# Patient Record
Sex: FEMALE | Race: BLACK OR AFRICAN AMERICAN | Employment: OTHER | URBAN - METROPOLITAN AREA
[De-identification: names, ages, dates, MRNs, and addresses within clinical notes are randomized per-mention and may not be internally consistent; named-entity substitution may affect disease eponyms.]

---

## 2023-04-06 ENCOUNTER — OFFICE VISIT (OUTPATIENT)
Dept: ORTHOPEDIC SURGERY | Age: 78
End: 2023-04-06

## 2023-04-06 DIAGNOSIS — M25.512 LEFT SHOULDER PAIN, UNSPECIFIED CHRONICITY: Primary | ICD-10-CM

## 2023-04-06 DIAGNOSIS — M67.912 TENDINOPATHY OF LEFT ROTATOR CUFF: ICD-10-CM

## 2023-04-06 RX ORDER — METHYLPREDNISOLONE ACETATE 40 MG/ML
40 INJECTION, SUSPENSION INTRA-ARTICULAR; INTRALESIONAL; INTRAMUSCULAR; SOFT TISSUE ONCE
Status: COMPLETED | OUTPATIENT
Start: 2023-04-06 | End: 2023-04-06

## 2023-04-06 RX ADMIN — METHYLPREDNISOLONE ACETATE 40 MG: 40 INJECTION, SUSPENSION INTRA-ARTICULAR; INTRALESIONAL; INTRAMUSCULAR; SOFT TISSUE at 10:58

## 2023-04-06 NOTE — PROGRESS NOTES
Name: Cas Salas  YOB: 1945  Gender: female  MRN: [id number]  Date of Encounter:  4/6/2023       CHIEF COMPLAINT:     Chief Complaint   Patient presents with    Shoulder Pain     Left shoulder        SUBJECTIVE/OBJECTIVE:      HPI:    Patient is a [de-identified] y.o. pleasant female who presents today for a new evaluation of her left shoulder pain. She reports chronic left shoulder pain for a number of years. She was previously seen by Dr. Amos Warren, and likely in 2020 as we have previous images at that time. He gave her an injection and that gave her considerable pain relief for a number of years. She frequently gardens and is outdoors and has shoulder pain, particularly with lifting the left arm. She has not done any other form of therapy or treatment at this time. She is interested in repeat injection. PAST HISTORY:   Past medical, surgical, family, social history and allergies reviewed by me. Pertinent history:   Tobacco use:  has no history on file for tobacco use. Diabetes: none  Anticoagulation: no      REVIEW OF SYSTEMS:   As noted in HPI. PHYSICAL EXAMINATION:     Gen: Well-developed, no acute distress   HEENT: NC/AT, EOMI   Neck: Trachea midline, normal ROM   CV: Regular rhythm by palpation of distal pulse, normal capillary refill   Pulm: No respiratory distress, no stridor   Psychiatric: Well oriented, normal mood and affect. Skin: No rashes, lesions or ulcers, normal temperature, turgor, and texture on uninvolved extremity. ORTHO EXAM:    Left Shoulder: Active abduction 110, forward flexion 120. External rotation 40. Internal rotation lumbar spine. DIAGNOSTIC IMAGING:     X-ray 3 vw LEFT shoulder Grashey  / axillary / scapular Y    Findings: There is mild glenohumeral joint arthritis. Moderate AC joint arthritic changes. There is no acute fracture. No osseous lesion. No effusion.     Impression: Degenerative changes of left shoulder    I independently interpreted